# Patient Record
(demographics unavailable — no encounter records)

---

## 2025-01-06 NOTE — ASSESSMENT
[FreeTextEntry1] : The patient is a 49-year-old G4, P2 perimenopausal white female of northern  descent with Spanish and Danish heritage. She underwent menarche at age 12 and had her first child at age 30. She has a family history of cancer with her mother had uterine cancer in her 60s and melanoma on her foot in her 70s. Her father had melanoma the back in his 20s. The patient herself has a history of polycystic ovarian syndrome. She underwent a screening mammography on April 26, 2022 at [facility] showing some calcifications in the upper outer aspect of the right breast which persisted on diagnostic imaging. Ultrasound was negative. She underwent a stereotactic right breast upper outer quadrant core biopsy on May 10, 2022 which showed some flat epithelial atypia. I was able to review her films from [facility] which did show this area of calcifications. These were appropriately biopsied and the pathology showed flat epithelial atypia but this was not associated with the calcifications. She underwent a wide excision with a Scarlet  localization on June 17, 2022. The final pathology showed some further flat epithelial atypia but no malignancy.  I did speak to her about the possible use of tamoxifen for risk reduction and the use of screening MRI for close surveillance.  She underwent her last bilateral breast MRI which was performed on March 1, 2024 and performed at [facility] which showed no suspicious findings.  She underwent her last bilateral mammography and ultrasound which was reviewed from September 20, 2024 performed at [facility] which showed no suspicious findings and just postsurgical changes in the right breast upper outer quadrant as well as some bilateral cysts.  On exam today, she has healed well from her right breast periareolar incision and she has no suspicious findings. She understood the need for routine follow-up and I can see her again in 1 year in February 2026 and she should continue with yearly mammography and ultrasound which will be due next in September 2025 and she was given prescriptions. I would like her to get another MRI in March 2025 and we will get preapproval and follow-up on the results. She did not take tamoxifen due to the risk/side effect profile.  She is having perimenopausal symptoms and is considering temporary hormone replacement therapy which I have no problem with as long as it is for a short duration.

## 2025-01-06 NOTE — PHYSICAL EXAM
[Normocephalic] : normocephalic [Atraumatic] : atraumatic [EOMI] : extra ocular movement intact [Supple] : supple [No Supraclavicular Adenopathy] : no supraclavicular adenopathy [No Cervical Adenopathy] : no cervical adenopathy [Examined in the supine and seated position] : examined in the supine and seated position [No dominant masses] : no dominant masses in right breast  [No dominant masses] : no dominant masses left breast [No Nipple Retraction] : no left nipple retraction [No Nipple Discharge] : no left nipple discharge [Breast Mass Right Breast ___cm] : no masses [Breast Mass Left Breast ___cm] : no masses [Breast Nipple Inversion] : nipples not inverted [Breast Nipple Retraction] : nipples not retracted [Breast Nipple Flattening] : nipples not flattened [Breast Nipple Fissures] : nipples not fissured [Breast Abnormal Lactation (Galactorrhea)] : no galactorrhea [Breast Abnormal Secretion Bloody Fluid] : no bloody discharge [Breast Abnormal Secretion Serous Fluid] : no serous discharge [Breast Abnormal Secretion Opalescent Fluid] : no milky discharge [No Axillary Lymphadenopathy] : no left axillary lymphadenopathy [No Edema] : no edema [No Rashes] : no rashes [No Ulceration] : no ulceration [de-identified] : On exam, the patient has ptotic D-cup breasts.  She has healed well from her right breast wide excision has no suspicious findings.  She has no axillary, supraclavicular, or cervical adenopathy. [de-identified] : Healed incision from her recent right breast wide excision for flat epithelial atypia.

## 2025-01-06 NOTE — HISTORY OF PRESENT ILLNESS
[FreeTextEntry1] : The patient is a 49-year-old G4, P2 perimenopausal white female of northern  descent with Filipino and Icelandic heritage.  She underwent menarche at age 12 and had her first child at age 30.  She has a family history of cancer with her mother had uterine cancer in her 60s and melanoma on her foot in her 70s.  Her father had melanoma the back in his 20s.  The patient herself has a history of polycystic ovarian syndrome.  She underwent a screening mammography on April 26, 2022 at [place] showing some calcifications in the upper outer aspect of the right breast which persisted on diagnostic imaging.  Ultrasound was negative.  She underwent a stereotactic right breast upper outer quadrant core biopsy on May 10, 2022 which showed some flat epithelial atypia.  She underwent a partial mastectomy with Scarlet  localization due to this atypia on June 17, 2022.  Final pathology just showed some further flat epithelial atypia but no malignancy.  She understood the benefits for endocrine therapy for risk reduction but decided against it due to the risk side effect profile.  She comes in now for routine follow-up.

## 2025-01-09 NOTE — ASSESSMENT
[FreeTextEntry1] : I recommend she see Dr. Eboni Lerma, our menopause expert, for discussion as well as routine gyn care, including pap smear. Cont to see Dr. Dyson and team for breast monitoring. Due for colonoscopy in just under 2 years.  Aware. When she turns 50 this year, should consider PCV21 and Shingrix shots.

## 2025-01-09 NOTE — HEALTH RISK ASSESSMENT
[0] : 2) Feeling down, depressed, or hopeless: Not at all (0) [PHQ-2 Negative - No further assessment needed] : PHQ-2 Negative - No further assessment needed [Never] : Never [0-4] : 0-4 [[id number]] : 0

## 2025-01-09 NOTE — HISTORY OF PRESENT ILLNESS
[FreeTextEntry1] : Pt here for annual wellness visit [de-identified] : Pt here for annual wellness visit. Started compounded Ozempic.  Lost over 20 lbs.  Dropped from size 14 to size 8.  Had been on 1mg weekly for a long time. Currently on 1.7mg weekly.  Food noise has gone down.  Has to make sure she eats enough and good food.  Maybe less interest in alcohol, but htat had never been a problem. Mood might be more stable due to less blood sugar swings.  Downsides have been that appetite is low at the end of the day and can't enjoy dinner with people at a fancy restaurant. Gets occ bruises at injection sites. Pays about $160/month.  Gets it from [employer].  and getting support payments.  Has to manage finances. Periods have improved on GLP1.  More regular.  Just two days of bleeding.  In the past, had tried an IUD but couldn't get it in place.  So she qualifies for IV sedation.  Interested in menopause expert. Partner has vasectomy.   Just diagnosed with ADHD.  Therapist did a screening.  Pt met with psych recently.  Telehealth.  Prescribed Strattera. Ongoing ezcema of fingernail.  Sees derm.  Using topical treatments. Takes MVI. Might take a sleep aid.  Had used trazodone in the past.  Would like to try it again.  Benadryl starts making her feel depressed after a few nights in a row. Last colonoscopy 12/1/2023 by Dr. Thelma Padilla.  Repeat 3 years. Up to date with mammo.  Has f/u with Dr. Patrick. Got updated flu and COVID vaccines in the fall. Wants STI screening.   Started minoxidil with derm.  Working.

## 2025-02-03 NOTE — REASON FOR VISIT
[Follow-Up: _____] : a [unfilled] follow-up visit [FreeTextEntry1] : The patient comes in and is of northern  descent with no family history of breast or ovarian cancer.  She was found to have some calcifications in her right breast upper outer quadrant on screening mammography in April 2022 and stereotactic core biopsy in May 2022 showed some flat epithelial atypia.  She underwent a wide excision on June 17, 2022.  She was offered endocrine therapy for risk reduction but declined due to the risk side effect profile and she comes in now for routine follow-up.

## 2025-02-03 NOTE — ASSESSMENT
[FreeTextEntry1] : The patient is a 49-year-old G4, P2 perimenopausal white female of northern  descent with Ugandan and British heritage. She underwent menarche at age 12 and had her first child at age 30. She has a family history of cancer with her mother had uterine cancer in her 60s and melanoma on her foot in her 70s. Her father had melanoma the back in his 20s. The patient herself has a history of polycystic ovarian syndrome. She underwent a screening mammography on April 26, 2022 at [facility] showing some calcifications in the upper outer aspect of the right breast which persisted on diagnostic imaging. Ultrasound was negative. She underwent a stereotactic right breast upper outer quadrant core biopsy on May 10, 2022 which showed some flat epithelial atypia. I was able to review her films from [facility] which did show this area of calcifications. These were appropriately biopsied and the pathology showed flat epithelial atypia but this was not associated with the calcifications. She underwent a wide excision with a Scarlet  localization on June 17, 2022. The final pathology showed some further flat epithelial atypia but no malignancy.  I did speak to her about the possible use of tamoxifen for risk reduction and the use of screening MRI for close surveillance.  She underwent her last bilateral breast MRI which was performed on March 1, 2024 and performed at [facility] which showed no suspicious findings.  She underwent her last bilateral mammography and ultrasound which was reviewed from September 20, 2024 performed at [facility] which showed no suspicious findings and just postsurgical changes in the right breast upper outer quadrant as well as some bilateral cysts.  On exam today, she has healed well from her right breast periareolar incision and she has no suspicious findings. She understood the need for routine follow-up and I can see her again in 1 year in February 2026 and she should continue with yearly mammography and ultrasound which will be due next in September 2025 and she was given prescriptions. I would like her to get another MRI in March 2025 and we will get preapproval and follow-up on the results. She did not take tamoxifen due to the risk/side effect profile.  She is having perimenopausal symptoms and is considering using vaginal estrogen creams which I have no problem with.  She is also currently on Ozempic and has lost 25 pounds.

## 2025-02-03 NOTE — ASSESSMENT
[FreeTextEntry1] : The patient is a 49-year-old G4, P2 perimenopausal white female of northern  descent with Eritrean and Ugandan heritage. She underwent menarche at age 12 and had her first child at age 30. She has a family history of cancer with her mother had uterine cancer in her 60s and melanoma on her foot in her 70s. Her father had melanoma the back in his 20s. The patient herself has a history of polycystic ovarian syndrome. She underwent a screening mammography on April 26, 2022 at [place] showing some calcifications in the upper outer aspect of the right breast which persisted on diagnostic imaging. Ultrasound was negative. She underwent a stereotactic right breast upper outer quadrant core biopsy on May 10, 2022 which showed some flat epithelial atypia. I was able to review her films from [facility] which did show this area of calcifications. These were appropriately biopsied and the pathology showed flat epithelial atypia but this was not associated with the calcifications. She underwent a wide excision with a Scarlet  localization on June 17, 2022. The final pathology showed some further flat epithelial atypia but no malignancy.  I did speak to her about the possible use of tamoxifen for risk reduction and the use of screening MRI for close surveillance.  She underwent her last bilateral breast MRI which was performed on March 1, 2024 and performed at [place] which showed no suspicious findings.  She underwent her last bilateral mammography and ultrasound which was reviewed from September 20, 2024 performed at [place] which showed no suspicious findings and just postsurgical changes in the right breast upper outer quadrant as well as some bilateral cysts.  On exam today, she has healed well from her right breast periareolar incision and she has no suspicious findings. She understood the need for routine follow-up and I can see her again in 1 year in February 2026 and she should continue with yearly mammography and ultrasound which will be due next in September 2025 and she was given prescriptions. I would like her to get another MRI in March 2025 and we will get preapproval and follow-up on the results. She did not take tamoxifen due to the risk/side effect profile.  She is having perimenopausal symptoms and is considering using vaginal estrogen creams which I have no problem with.  She is also currently on Ozempic and has lost 25 pounds.

## 2025-02-03 NOTE — PHYSICAL EXAM
[Normocephalic] : normocephalic [Atraumatic] : atraumatic [EOMI] : extra ocular movement intact [Supple] : supple [No Supraclavicular Adenopathy] : no supraclavicular adenopathy [No Cervical Adenopathy] : no cervical adenopathy [Examined in the supine and seated position] : examined in the supine and seated position [No dominant masses] : no dominant masses in right breast  [No dominant masses] : no dominant masses left breast [No Nipple Retraction] : no left nipple retraction [No Nipple Discharge] : no left nipple discharge [Breast Mass Right Breast ___cm] : no masses [Breast Mass Left Breast ___cm] : no masses [No Axillary Lymphadenopathy] : no left axillary lymphadenopathy [No Edema] : no edema [No Rashes] : no rashes [No Ulceration] : no ulceration [Breast Nipple Inversion] : nipples not inverted [Breast Nipple Retraction] : nipples not retracted [Breast Nipple Flattening] : nipples not flattened [Breast Nipple Fissures] : nipples not fissured [Breast Abnormal Lactation (Galactorrhea)] : no galactorrhea [Breast Abnormal Secretion Bloody Fluid] : no bloody discharge [Breast Abnormal Secretion Serous Fluid] : no serous discharge [Breast Abnormal Secretion Opalescent Fluid] : no milky discharge [de-identified] : On exam, the patient has ptotic D-cup breasts.  She has healed well from her right breast wide excision has no suspicious findings.  She has no axillary, supraclavicular, or cervical adenopathy. [de-identified] : Healed incision from her recent right breast wide excision for flat epithelial atypia.

## 2025-02-03 NOTE — HISTORY OF PRESENT ILLNESS
[FreeTextEntry1] : The patient is a 49-year-old G4, P2 perimenopausal white female of northern  descent with Tristanian and Moroccan heritage.  She underwent menarche at age 12 and had her first child at age 30.  She has a family history of cancer with her mother had uterine cancer in her 60s and melanoma on her foot in her 70s.  Her father had melanoma the back in his 20s.  The patient herself has a history of polycystic ovarian syndrome.  She underwent a screening mammography on April 26, 2022 at [place] showing some calcifications in the upper outer aspect of the right breast which persisted on diagnostic imaging.  Ultrasound was negative.  She underwent a stereotactic right breast upper outer quadrant core biopsy on May 10, 2022 which showed some flat epithelial atypia.  She underwent a partial mastectomy with Scarlet  localization due to this atypia on June 17, 2022.  Final pathology just showed some further flat epithelial atypia but no malignancy.  She understood the benefits for endocrine therapy for risk reduction but decided against it due to the risk side effect profile.  She comes in now for routine follow-up.

## 2025-02-03 NOTE — PHYSICAL EXAM
[Normocephalic] : normocephalic [Atraumatic] : atraumatic [EOMI] : extra ocular movement intact [Supple] : supple [No Supraclavicular Adenopathy] : no supraclavicular adenopathy [No Cervical Adenopathy] : no cervical adenopathy [Examined in the supine and seated position] : examined in the supine and seated position [No dominant masses] : no dominant masses in right breast  [No dominant masses] : no dominant masses left breast [No Nipple Retraction] : no left nipple retraction [No Nipple Discharge] : no left nipple discharge [Breast Mass Right Breast ___cm] : no masses [Breast Mass Left Breast ___cm] : no masses [No Axillary Lymphadenopathy] : no left axillary lymphadenopathy [No Edema] : no edema [No Rashes] : no rashes [No Ulceration] : no ulceration [Breast Nipple Inversion] : nipples not inverted [Breast Nipple Retraction] : nipples not retracted [Breast Nipple Flattening] : nipples not flattened [Breast Nipple Fissures] : nipples not fissured [Breast Abnormal Lactation (Galactorrhea)] : no galactorrhea [Breast Abnormal Secretion Bloody Fluid] : no bloody discharge [Breast Abnormal Secretion Serous Fluid] : no serous discharge [Breast Abnormal Secretion Opalescent Fluid] : no milky discharge [de-identified] : On exam, the patient has ptotic D-cup breasts.  She has healed well from her right breast wide excision has no suspicious findings.  She has no axillary, supraclavicular, or cervical adenopathy. [de-identified] : Healed incision from her recent right breast wide excision for flat epithelial atypia.

## 2025-02-03 NOTE — ADDENDUM
[FreeTextEntry1] : I spent greater than 75% of the consultation in face-to-face counseling and coordination of care in this patient at increased risk of breast cancer with a history of flat epithelial atypia for which she comes in for breast cancer screening/surveillance.

## 2025-02-03 NOTE — HISTORY OF PRESENT ILLNESS
[FreeTextEntry1] : The patient is a 49-year-old G4, P2 perimenopausal white female of northern  descent with Vietnamese and Guinean heritage.  She underwent menarche at age 12 and had her first child at age 30.  She has a family history of cancer with her mother had uterine cancer in her 60s and melanoma on her foot in her 70s.  Her father had melanoma the back in his 20s.  The patient herself has a history of polycystic ovarian syndrome.  She underwent a screening mammography on April 26, 2022 at [place] showing some calcifications in the upper outer aspect of the right breast which persisted on diagnostic imaging.  Ultrasound was negative.  She underwent a stereotactic right breast upper outer quadrant core biopsy on May 10, 2022 which showed some flat epithelial atypia.  She underwent a partial mastectomy with Scarlet  localization due to this atypia on June 17, 2022.  Final pathology just showed some further flat epithelial atypia but no malignancy.  She understood the benefits for endocrine therapy for risk reduction but decided against it due to the risk side effect profile.  She comes in now for routine follow-up.